# Patient Record
(demographics unavailable — no encounter records)

---

## 2020-03-17 NOTE — EDM.PDOC
ED HPI GENERAL MEDICAL PROBLEM





- General


Chief Complaint: Back Pain or Injury


Stated Complaint: SIDE/HIP PAIN


Time Seen by Provider: 03/17/20 21:11


Source of Information: Reports: Patient


History Limitations: Reports: No Limitations





- History of Present Illness


INITIAL COMMENTS - FREE TEXT/NARRATIVE: 


Patient is a 41 year old male who complains of 7 day history of sharp back pain 

in the left lateral lower back and left hip.  Has  history of similar back 

problems. Exacerbating factors: movement. Alleviating factors: none. Radiation: 

left leg in sciatic distribution. Otherwise: (-) fever  (-) recent trauma (-) 

weight loss (-) incontinence (-) bowel/bladder dysfunction (-) saddle 

anesthesia (-) history of cancer (-) weakness (-) paresthesias (-) immune 

compromise (-) recent back surgery or instrumentation. Has history of back 

surgery 14 years ago secondary to herniated disc. Went to chiropractor 

yesterday and had some relief after acupuncture, but pain returned today. Pt 

has been taking Tylenol. Is not on narcotics. 





REVIEW OF SYSTEMS:  Other than the symptoms associated with the present events, 

the following is reported with regard to recent health:  


General:  (-) fever.  


HENT:  (-) congestion. 


 Respiratory:  (-) cough.  


Cardiovascular:  (-) chest pain.  


GI:  (-) abdominal pain.  


:  (-) urinary complaints. 


 Musculoskeletal:  (-) other aches or pains. 


 Endocrine:  (-) generalized weakness.  


Neurological:  (-) localized weakness.  


Skin: (-) rash








 PAST MEDICAL HISTORY: reviewed as per nursing notes


 SOCIAL HISTORY:  reviewed as per nursing notes,


 MEDICATIONS:  Per nurse's note


 ALLERGIES:  Per nurse's note, reviewed by me 








 PHYSICAL EXAMINATION:





GENERALIZED APPEARANCE: well developed well nourished in mild to moderate 

painful distress sitting down. Pt ambulatory in ED


VITAL SIGNS:  Per nurse's note, reviewed by me 


SKIN:  Warm, dry; (-) cyanosis; (-) rash.


HEAD:  (-) scalp swelling,


EYES:  (-) conjunctival pallor, (-) scleral icterus.


ENMT:  ; airway patent: (-) stridor; mucous membranes moist.


NECK:  supple (-) stiffness,


CHEST AND RESPIRATORY:  (-) rales, (-) rhonchi, (-) wheezes; breath sounds 

equal bilaterally.


HEART AND CARDIOVASCULAR:  (-) irregularity; (-) murmur, (-) gallop.


ABDOMEN AND GI:  Soft; (-) tenderness


BACK:  (-) scars (-)gross kyphosis (-) gross scoliosis. (-) step off or 

deformity (-) direct vertebral tenderness (+) left LS paravertebral tenderness (

+)left SI tenderness (+)left  trochanteric tenderness. (+) able to flex, extend 

and lateral bend. 


EXTREMITIES:  (-) deformity, (-) edema.  5/5+ strength LE bilaterally. 2+ DP. 

cap refill < 2 seconds. Good tone. (-) atrophy


NEURO AND PSYCH:  Awake, alert.  Cranial nerves grossly intact; strength 

symmetric. sensation to  thigh, lateral foot and 1st toe intact and equal 

bilaterally. 2+ DTR bilaterally. Gait steady . 








 DIAGNOSTICS:











EMERGENCY DEPARTMENT COURSE AND TREATMENT:  Patient's condition remained stable 

during Emergency Department evaluation.  Based on my history, physical exam, 

and diagnostic evaluation, the patient appears to have symptoms consistent with 

low risk back pain. There are no high risk signs or symptoms, no history of 

intravenous drug abuse, no history of cancer, no weakness, and no history of 

bowel or bladder incontinence. Vital signs show normal heart rate, BP, and 

oxygen saturation on room air and the patient has a normal neurologic exam. 

This is most likely a myofascial pain. The patient was able to ambulate in the 

ED without difficulty. Discharge precautions were given with instructions to 

return if increasing pain, weakness, fevers, or new symptoms. I encouraged 

follow-up with their primary care physician  for repeat exam in 1-2 days.














PLAN AND FOLLOW-UP:  Patient received written and verbal instructions regarding 

this condition.  Return to ED immediately with any new or worsening symptoms. 

Follow up to be arranged by patient with pcp in 1-2 days for further 

evaluation. Given discharge precautions.  Patient expressed verbal 

understanding.





  ** Lower Back


Pain Score (Numeric/FACES): 10





- Related Data


 Allergies











Allergy/AdvReac Type Severity Reaction Status Date / Time


 


No Known Allergies Allergy   Verified 03/17/20 21:24











Home Meds: 


 Home Meds





Acetaminophen/oxyCODONE [Percocet 325-5 MG] 1 each PO Q6HR #10 tab 03/17/20 [Rx]


Cyclobenzaprine [Flexeril] 10 mg PO TID #20 tab 03/17/20 [Rx]


Ibuprofen [Motrin] 600 mg PO Q6H PRN #20 tab 03/17/20 [Rx]


Lidocaine 5% [Lidoderm 5%] 1 patch TOP DAILY 6 Days #6 patch 03/17/20 [Rx]











Past Medical History


Musculoskeletal History: Reports: Back Pain, Chronic


Endocrine/Metabolic History: Reports: Other (See Below)


Other Endocrine/Metabolic History: borderline diabetic





- Past Surgical History


Musculoskeletal Surgical History: Reports: Other (See Below)


Other Musculoskeletal Surgeries/Procedures:: back surgery for hernaited disc/

pinched nerves- unsure of what level





Social & Family History





- Family History


Family Medical History: Noncontributory





- Tobacco Use


Smoking Status *Q: Never Smoker


Second Hand Smoke Exposure: No





- Recreational Drug Use


Recreational Drug Use: No





ED ROS GENERAL





- Review of Systems


Review Of Systems: See Below (see dictation)





ED EXAM, GENERAL





- Physical Exam


Exam: See Below (see dictation)





Course





- Vital Signs


Last Recorded V/S: 


 Last Vital Signs











Temp  97.8 F   03/17/20 21:14


 


Pulse  84   03/17/20 21:14


 


Resp  16   03/17/20 21:14


 


BP  126/66   03/17/20 21:14


 


Pulse Ox  96   03/17/20 21:14














- Orders/Labs/Meds


Meds: 


Medications














Discontinued Medications














Generic Name Dose Route Start Last Admin





  Trade Name Freq  PRN Reason Stop Dose Admin


 


Cyclobenzaprine HCl  10 mg  03/17/20 21:47  03/17/20 21:52





  Flexeril  PO  03/17/20 21:48  10 mg





  ONETIME ONE   Administration





     





     





     





     


 


Ketorolac Tromethamine  30 mg  03/17/20 21:47  03/17/20 21:52





  Toradol  IM  03/17/20 21:48  30 mg





  ONETIME ONE   Administration





     





     





     





     














Departure





- Departure


Time of Disposition: 21:48


Disposition: Home, Self-Care 01


Condition: Good


Clinical Impression: 


 Back pain, Sciatica








- Discharge Information


*PRESCRIPTION DRUG MONITORING PROGRAM REVIEWED*: Not Applicable


*COPY OF PRESCRIPTION DRUG MONITORING REPORT IN PATIENT GARRY: Not Applicable


Prescriptions: 


Acetaminophen/oxyCODONE [Percocet 325-5 MG] 1 each PO Q6HR #10 tab


Cyclobenzaprine [Flexeril] 10 mg PO TID #20 tab


Ibuprofen [Motrin] 600 mg PO Q6H PRN #20 tab


 PRN Reason: Pain


Instructions:  Acute Back Pain, Adult, Sciatica, Easy-to-Read


Referrals: 


PCP,None [Primary Care Provider] - 


Joe Akers [Ordering Only Provider] - 


Forms:  ED Department Discharge


Additional Instructions: 


The following information is given to patients seen in the emergency department 

who are being discharged to home. This information is to outline your options 

for follow-up care. We provide all patients seen in our emergency department 

with a follow-up referral.





The need for follow-up, as well as the timing and circumstances, are variable 

depending upon the specifics of your emergency department visit.





If you don't have a primary care physician on staff, we will provide you with a 

referral. We always advise you to contact your personal physician following an 

emergency department visit to inform them of the circumstance of the visit and 

for follow-up with them and/or the need for any referrals to a consulting 

specialist.





The emergency department will also refer you to a specialist when appropriate. 

This referral assures that you have the opportunity for follow-up care with a 

specialist. All of these measure are taken in an effort to provide you with 

optimal care, which includes your follow-up.





Under all circumstances we always encourage you to contact your private 

physician who remains a resource for coordinating your care. When calling for 

follow-up care, please make the office aware that this follow-up is from your 

recent emergency room visit. If for any reason you are refused follow-up, 

please contact the [facility] Emergency 

Department at [phone] and asked to speak to the emergency department 

charge nurse.








Sepsis Event Note





- Evaluation


Sepsis Screening Result: No Definite Risk





- Focused Exam


Vital Signs: 


 Vital Signs











  Temp Pulse Resp BP Pulse Ox


 


 03/17/20 21:14  97.8 F  84  16  126/66  96











Date Exam was Performed: 03/17/20


Time Exam was Performed: 21:53

## 2020-03-21 NOTE — CT
INDICATION:



Nontraumatic back pain with left-sided leg pain. 



COMPARISON:



None available 



TECHNIQUE:



CT examination of the lumbar spine is performed with spiral technique 

without contrast. 2 millimeter thick axial, sagittal and coronal 

reconstructions were made. 



Please note that all CT scans at this facility use dose modulation, 

iterative reconstruction, and/or weight-based dosing when appropriate to 

reduce radiation dose to as low as reasonably achievable. 



FINDINGS: :



There is minimal scoliosis of the inferior lumbar spine convex towards the 

left. 



The vertebral bodies are normal in height and they are in anatomic 

alignment. There is no sign of fracture or subluxation. 



There is decreased AP canal diameter from L2 through L5, representing mild 

congenital narrowing, predisposing the patient to spinal stenosis from 

degenerative disease. 



T12-L1: Normal.



L1-2: Normal. 



L2-3: Severe spinal stenosis produced by a moderate left paramedian disc 

bulge with posterior osteophytic ridging, resulting in prominent stenosis 

of the left lateral recess. There is probably compression of the left L3 

nerve root in the lateral recess. Moderate left lateral disc bulging into 

the neural foramen, with posterior osteophytic ridging. This impinges upon 

the left L2 nerve root, but does not appear to compress the nerve root. 

Mild right lateral disc bulging into the neural foramina, without contact 

with the exiting nerve root. Moderate loss of disc height from disc 

degenerative disease. 



L3-4: Moderate spinal stenosis from mild diffuse disc bulging superimposed 

upon congenital narrowing. Moderate left and mild right lateral disc 

bulging into the neural foramina, without contact with the exiting nerve 

roots. 



L4-5: Severe spinal stenosis from congenital narrowing, moderate diffuse 

disc bulging, and mild ligamentum flavum hypertrophy. Moderate right 

lateral disc bulging into the neural foramina impinges upon the right L4 

nerve root, without compression of the nerve root. Mild left lateral disc 

bulging into the neural foramina without impingement upon the exiting nerve 

root. Moderate disc degenerative disease. 



L5-S1: Moderate left lateral disc bulge with posterior osteophytic ridging, 

impinging upon the left S1 nerve root in the lateral recess, but without 

displacement or compression of the nerve root. Severe foraminal stenosis on 

the left from a combination of moderate lateral disc bulging, posterior 

osteophytic ridging, and left facet hypertrophy, appearing to efface the 

fat from around the left L5 nerve root. Mild right lateral disc bulging 

into the neural foramina with posterior osteophytic ridging, without 

contact with the exiting nerve root. Mild left facet arthropathy. 



The visualized abdominal viscera is normal in appearance.



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 

osteophytes. 



IMPRESSION:



Severe spinal stenosis at L2-3 from a moderate left paramedian disc bulge 

and posterior osteophytic ridging. Probable compression of the left L3 

nerve root in the lateral recess. 



Severe spinal stenosis at L4-5. 



There appears to be prominent compression of the left L5 nerve root in the 

neural foramina as described above. 



Moderate spinal stenosis at L3-4. 



Lateral disc bulging into the neural foramina on the left at L2-3 and on 

the right at L4-5, coming into contact with the exiting nerve roots.



Please note that all CT scans at this facility use dose modulation, 

iterative reconstruction, and/or weight-based dosing when appropriate to 

reduce radiation dose to as low as reasonably achievable.



Dictated by Cheikh Schreiber MD @ Mar 21 2020 10:53PM



Signed by Dr. Cheikh Schreiber @ Mar 21 2020 11:06PM

## 2020-03-22 NOTE — EDM.PDOC
ED HPI GENERAL MEDICAL PROBLEM





- General


Chief Complaint: Lower Extremity Injury/Pain


Stated Complaint: LEG PAIN


Time Seen by Provider: 03/21/20 21:46


Source of Information: Reports: Patient


History Limitations: Reports: No Limitations





- History of Present Illness


INITIAL COMMENTS - FREE TEXT/NARRATIVE: 





Patient states he has numbness to the left leg and pain in the lower back.  

This is getting worse.  Patient denies trauma


Onset: Today


Duration: Chronic


Location: Reports: Back, Lower Extremity, Left


Quality: Reports: Ache


Severity: Mild


Improves with: Reports: None


Worsens with: Reports: None


Associated Symptoms: Reports: No Other Symptoms


  ** left hip


Pain Score (Numeric/FACES): 10





- Related Data


 Allergies











Allergy/AdvReac Type Severity Reaction Status Date / Time


 


No Known Allergies Allergy   Verified 03/21/20 21:43











Home Meds: 


 Home Meds





Acetaminophen/oxyCODONE [Percocet 325-5 MG] 1 each PO Q6HR #10 tab 03/17/20 [Rx]


Cyclobenzaprine [Flexeril] 10 mg PO TID #20 tab 03/17/20 [Rx]


Ibuprofen [Motrin] 600 mg PO Q6H PRN #20 tab 03/17/20 [Rx]


Lidocaine 5% [Lidoderm 5%] 1 patch TOP DAILY 6 Days #6 patch 03/17/20 [Rx]











Past Medical History


HEENT History: Reports: None


Cardiovascular History: Reports: None


Respiratory History: Reports: None


Gastrointestinal History: Reports: None


Genitourinary History: Reports: None


Musculoskeletal History: Reports: Back Pain, Chronic


Neurological History: Reports: None


Psychiatric History: Reports: None


Endocrine/Metabolic History: Reports: Other (See Below)


Other Endocrine/Metabolic History: borderline diabetic


Insulin Pump Model and : None


Hematologic History: Reports: None


Immunologic History: Reports: None


Oncologic (Cancer) History: Reports: None


Dermatologic History: Reports: None





- Infectious Disease History


Infectious Disease History: Reports: None





- Past Surgical History


Head Surgeries/Procedures: Reports: None


Musculoskeletal Surgical History: Reports: Other (See Below)


Other Musculoskeletal Surgeries/Procedures:: back surgery for hernaited disc/

pinched nerves- unsure of what level





Social & Family History





- Family History


Family Medical History: Noncontributory





- Tobacco Use


Smoking Status *Q: Never Smoker





- Caffeine Use


Caffeine Use: Reports: None





- Recreational Drug Use


Recreational Drug Use: No





Review of Systems





- Review of Systems


Review Of Systems: See Below


Constitutional: Reports: No Symptoms


Eyes: Reports: No Symptoms


Ears: Reports: No Symptoms


Nose: Reports: No Symptoms


Mouth/Throat: Reports: No Symptoms


Respiratory: Reports: No Symptoms


Cardiovascular: Reports: No Symptoms


GI/Abdominal: Reports: No Symptoms


Genitourinary: Reports: No Symptoms


Musculoskeletal: Reports: No Symptoms


Skin: Reports: No Symptoms


Neurological: Reports: No Symptoms


Psychiatric: Reports: No Symptoms





ED EXAM, GENERAL





- Physical Exam


Exam: See Below


Exam Limited By: No Limitations


General Appearance: Alert, WD/WN, No Apparent Distress


Ears: Normal External Exam, Normal Canal, Normal TMs


Ear Exam: Bilateral Ear: Auricle Normal, Canal Normal


Nose: Normal Inspection, Normal Mucosa


Throat/Mouth: Normal Inspection


Head: Atraumatic, Normocephalic


Neck: Normal Inspection, Supple


Respiratory/Chest: No Respiratory Distress, Lungs Clear


Cardiovascular: Normal Peripheral Pulses, Regular Rate, Rhythm


GI/Abdominal: Normal Bowel Sounds, Soft, Non-Tender


Back Exam: Normal Inspection, Full Range of Motion


Extremities: Normal Inspection, Normal Range of Motion, No Pedal Edema, Limited 

Range of Motion, Other (Pain in the back.  Going down the leg)


Neurological: Alert


Psychiatric: Normal Affect, Normal Mood


Skin Exam: Warm, Dry





Course





- Vital Signs


Text/Narrative:: 





41-year-old male presents emergency room which appears to be sciatica.  Patient 

states he has back surgery in the past does not know much about the outcome.  

Patient's has no acute trauma.


Last Recorded V/S: 





 Last Vital Signs











Temp  97.5 F   03/21/20 21:40


 


Pulse  92   03/21/20 21:40


 


Resp  18   03/21/20 21:40


 


BP  116/90   03/21/20 21:40


 


Pulse Ox  98   03/21/20 21:40














- Orders/Labs/Meds


Meds: 





Medications














Discontinued Medications














Generic Name Dose Route Start Last Admin





  Trade Name Carlosq  PRN Reason Stop Dose Admin


 


Ketorolac Tromethamine  30 mg  03/21/20 21:58  03/21/20 22:05





  Toradol  IM  03/21/20 21:59  30 mg





  ONETIME ONE   Administration





     





     





     





     














Departure





- Departure


Time of Disposition: 00:19


Disposition: Home, Self-Care 01


Condition: Good


Clinical Impression: 


 Sciatica








- Discharge Information


Instructions:  Sciatica, Easy-to-Read


Referrals: 


PCP,None [Primary Care Provider] - 





Sepsis Event Note





- Evaluation


Sepsis Screening Result: No Definite Risk





- Focused Exam


Vital Signs: 





 Vital Signs











  Temp Pulse Resp BP Pulse Ox


 


 03/21/20 21:40  97.5 F  92  18  116/90  98











Date Exam was Performed: 03/22/20


Time Exam was Performed: 00:10